# Patient Record
Sex: MALE | Race: WHITE | NOT HISPANIC OR LATINO | URBAN - METROPOLITAN AREA
[De-identification: names, ages, dates, MRNs, and addresses within clinical notes are randomized per-mention and may not be internally consistent; named-entity substitution may affect disease eponyms.]

---

## 2024-01-06 ENCOUNTER — INPATIENT (INPATIENT)
Facility: HOSPITAL | Age: 79
LOS: 1 days | Discharge: HOME | DRG: 871 | End: 2024-01-08
Attending: STUDENT IN AN ORGANIZED HEALTH CARE EDUCATION/TRAINING PROGRAM | Admitting: HOSPITALIST
Payer: MEDICARE

## 2024-01-06 VITALS — HEIGHT: 72 IN | WEIGHT: 205.03 LBS

## 2024-01-06 LAB
ALBUMIN SERPL ELPH-MCNC: 3.7 G/DL — SIGNIFICANT CHANGE UP (ref 3.3–5)
ALBUMIN SERPL ELPH-MCNC: 3.7 G/DL — SIGNIFICANT CHANGE UP (ref 3.3–5)
ALP SERPL-CCNC: 56 U/L — SIGNIFICANT CHANGE UP (ref 40–120)
ALP SERPL-CCNC: 56 U/L — SIGNIFICANT CHANGE UP (ref 40–120)
ALT FLD-CCNC: 46 U/L — HIGH (ref 10–45)
ALT FLD-CCNC: 46 U/L — HIGH (ref 10–45)
ANION GAP SERPL CALC-SCNC: 12 MMOL/L — SIGNIFICANT CHANGE UP (ref 5–17)
ANION GAP SERPL CALC-SCNC: 12 MMOL/L — SIGNIFICANT CHANGE UP (ref 5–17)
AST SERPL-CCNC: 53 U/L — HIGH (ref 10–40)
AST SERPL-CCNC: 53 U/L — HIGH (ref 10–40)
BASE EXCESS BLDV CALC-SCNC: 3.4 MMOL/L — HIGH (ref -2–3)
BASE EXCESS BLDV CALC-SCNC: 3.4 MMOL/L — HIGH (ref -2–3)
BASOPHILS # BLD AUTO: 0.03 K/UL — SIGNIFICANT CHANGE UP (ref 0–0.2)
BASOPHILS # BLD AUTO: 0.03 K/UL — SIGNIFICANT CHANGE UP (ref 0–0.2)
BASOPHILS NFR BLD AUTO: 0.2 % — SIGNIFICANT CHANGE UP (ref 0–2)
BASOPHILS NFR BLD AUTO: 0.2 % — SIGNIFICANT CHANGE UP (ref 0–2)
BILIRUB SERPL-MCNC: 0.4 MG/DL — SIGNIFICANT CHANGE UP (ref 0.2–1.2)
BILIRUB SERPL-MCNC: 0.4 MG/DL — SIGNIFICANT CHANGE UP (ref 0.2–1.2)
BUN SERPL-MCNC: 29 MG/DL — HIGH (ref 7–23)
BUN SERPL-MCNC: 29 MG/DL — HIGH (ref 7–23)
CA-I SERPL-SCNC: 1.23 MMOL/L — SIGNIFICANT CHANGE UP (ref 1.15–1.33)
CA-I SERPL-SCNC: 1.23 MMOL/L — SIGNIFICANT CHANGE UP (ref 1.15–1.33)
CALCIUM SERPL-MCNC: 9.3 MG/DL — SIGNIFICANT CHANGE UP (ref 8.4–10.5)
CALCIUM SERPL-MCNC: 9.3 MG/DL — SIGNIFICANT CHANGE UP (ref 8.4–10.5)
CHLORIDE BLDV-SCNC: 103 MMOL/L — SIGNIFICANT CHANGE UP (ref 96–108)
CHLORIDE BLDV-SCNC: 103 MMOL/L — SIGNIFICANT CHANGE UP (ref 96–108)
CHLORIDE SERPL-SCNC: 104 MMOL/L — SIGNIFICANT CHANGE UP (ref 96–108)
CHLORIDE SERPL-SCNC: 104 MMOL/L — SIGNIFICANT CHANGE UP (ref 96–108)
CO2 BLDV-SCNC: 30 MMOL/L — HIGH (ref 22–26)
CO2 BLDV-SCNC: 30 MMOL/L — HIGH (ref 22–26)
CO2 SERPL-SCNC: 25 MMOL/L — SIGNIFICANT CHANGE UP (ref 22–31)
CO2 SERPL-SCNC: 25 MMOL/L — SIGNIFICANT CHANGE UP (ref 22–31)
CREAT SERPL-MCNC: 0.86 MG/DL — SIGNIFICANT CHANGE UP (ref 0.5–1.3)
CREAT SERPL-MCNC: 0.86 MG/DL — SIGNIFICANT CHANGE UP (ref 0.5–1.3)
EGFR: 89 ML/MIN/1.73M2 — SIGNIFICANT CHANGE UP
EGFR: 89 ML/MIN/1.73M2 — SIGNIFICANT CHANGE UP
EOSINOPHIL # BLD AUTO: 0 K/UL — SIGNIFICANT CHANGE UP (ref 0–0.5)
EOSINOPHIL # BLD AUTO: 0 K/UL — SIGNIFICANT CHANGE UP (ref 0–0.5)
EOSINOPHIL NFR BLD AUTO: 0 % — SIGNIFICANT CHANGE UP (ref 0–6)
EOSINOPHIL NFR BLD AUTO: 0 % — SIGNIFICANT CHANGE UP (ref 0–6)
FLUAV AG NPH QL: DETECTED
FLUAV AG NPH QL: DETECTED
FLUBV AG NPH QL: SIGNIFICANT CHANGE UP
FLUBV AG NPH QL: SIGNIFICANT CHANGE UP
GAS PNL BLDV: 136 MMOL/L — SIGNIFICANT CHANGE UP (ref 136–145)
GAS PNL BLDV: 136 MMOL/L — SIGNIFICANT CHANGE UP (ref 136–145)
GAS PNL BLDV: SIGNIFICANT CHANGE UP
GLUCOSE BLDV-MCNC: 130 MG/DL — HIGH (ref 70–99)
GLUCOSE BLDV-MCNC: 130 MG/DL — HIGH (ref 70–99)
GLUCOSE SERPL-MCNC: 124 MG/DL — HIGH (ref 70–99)
GLUCOSE SERPL-MCNC: 124 MG/DL — HIGH (ref 70–99)
HCO3 BLDV-SCNC: 28 MMOL/L — SIGNIFICANT CHANGE UP (ref 22–29)
HCO3 BLDV-SCNC: 28 MMOL/L — SIGNIFICANT CHANGE UP (ref 22–29)
HCT VFR BLD CALC: 43.7 % — SIGNIFICANT CHANGE UP (ref 39–50)
HCT VFR BLD CALC: 43.7 % — SIGNIFICANT CHANGE UP (ref 39–50)
HCT VFR BLDA CALC: 47 % — SIGNIFICANT CHANGE UP (ref 39–51)
HCT VFR BLDA CALC: 47 % — SIGNIFICANT CHANGE UP (ref 39–51)
HGB BLD CALC-MCNC: 15.6 G/DL — SIGNIFICANT CHANGE UP (ref 12.6–17.4)
HGB BLD CALC-MCNC: 15.6 G/DL — SIGNIFICANT CHANGE UP (ref 12.6–17.4)
HGB BLD-MCNC: 14.6 G/DL — SIGNIFICANT CHANGE UP (ref 13–17)
HGB BLD-MCNC: 14.6 G/DL — SIGNIFICANT CHANGE UP (ref 13–17)
IMM GRANULOCYTES NFR BLD AUTO: 0.8 % — SIGNIFICANT CHANGE UP (ref 0–0.9)
IMM GRANULOCYTES NFR BLD AUTO: 0.8 % — SIGNIFICANT CHANGE UP (ref 0–0.9)
LACTATE BLDV-MCNC: 1.8 MMOL/L — SIGNIFICANT CHANGE UP (ref 0.5–2)
LACTATE BLDV-MCNC: 1.8 MMOL/L — SIGNIFICANT CHANGE UP (ref 0.5–2)
LYMPHOCYTES # BLD AUTO: 0.84 K/UL — LOW (ref 1–3.3)
LYMPHOCYTES # BLD AUTO: 0.84 K/UL — LOW (ref 1–3.3)
LYMPHOCYTES # BLD AUTO: 5.4 % — LOW (ref 13–44)
LYMPHOCYTES # BLD AUTO: 5.4 % — LOW (ref 13–44)
MAGNESIUM SERPL-MCNC: 2.2 MG/DL — SIGNIFICANT CHANGE UP (ref 1.6–2.6)
MAGNESIUM SERPL-MCNC: 2.2 MG/DL — SIGNIFICANT CHANGE UP (ref 1.6–2.6)
MCHC RBC-ENTMCNC: 28.1 PG — SIGNIFICANT CHANGE UP (ref 27–34)
MCHC RBC-ENTMCNC: 28.1 PG — SIGNIFICANT CHANGE UP (ref 27–34)
MCHC RBC-ENTMCNC: 33.4 GM/DL — SIGNIFICANT CHANGE UP (ref 32–36)
MCHC RBC-ENTMCNC: 33.4 GM/DL — SIGNIFICANT CHANGE UP (ref 32–36)
MCV RBC AUTO: 84.2 FL — SIGNIFICANT CHANGE UP (ref 80–100)
MCV RBC AUTO: 84.2 FL — SIGNIFICANT CHANGE UP (ref 80–100)
MONOCYTES # BLD AUTO: 1.28 K/UL — HIGH (ref 0–0.9)
MONOCYTES # BLD AUTO: 1.28 K/UL — HIGH (ref 0–0.9)
MONOCYTES NFR BLD AUTO: 8.2 % — SIGNIFICANT CHANGE UP (ref 2–14)
MONOCYTES NFR BLD AUTO: 8.2 % — SIGNIFICANT CHANGE UP (ref 2–14)
NEUTROPHILS # BLD AUTO: 13.35 K/UL — HIGH (ref 1.8–7.4)
NEUTROPHILS # BLD AUTO: 13.35 K/UL — HIGH (ref 1.8–7.4)
NEUTROPHILS NFR BLD AUTO: 85.4 % — HIGH (ref 43–77)
NEUTROPHILS NFR BLD AUTO: 85.4 % — HIGH (ref 43–77)
NRBC # BLD: 0 /100 WBCS — SIGNIFICANT CHANGE UP (ref 0–0)
NRBC # BLD: 0 /100 WBCS — SIGNIFICANT CHANGE UP (ref 0–0)
PCO2 BLDV: 44 MMHG — SIGNIFICANT CHANGE UP (ref 42–55)
PCO2 BLDV: 44 MMHG — SIGNIFICANT CHANGE UP (ref 42–55)
PH BLDV: 7.42 — SIGNIFICANT CHANGE UP (ref 7.32–7.43)
PH BLDV: 7.42 — SIGNIFICANT CHANGE UP (ref 7.32–7.43)
PHOSPHATE SERPL-MCNC: 1.9 MG/DL — LOW (ref 2.5–4.5)
PHOSPHATE SERPL-MCNC: 1.9 MG/DL — LOW (ref 2.5–4.5)
PLATELET # BLD AUTO: 277 K/UL — SIGNIFICANT CHANGE UP (ref 150–400)
PLATELET # BLD AUTO: 277 K/UL — SIGNIFICANT CHANGE UP (ref 150–400)
PO2 BLDV: 34 MMHG — SIGNIFICANT CHANGE UP (ref 25–45)
PO2 BLDV: 34 MMHG — SIGNIFICANT CHANGE UP (ref 25–45)
POTASSIUM BLDV-SCNC: 3.6 MMOL/L — SIGNIFICANT CHANGE UP (ref 3.5–5.1)
POTASSIUM BLDV-SCNC: 3.6 MMOL/L — SIGNIFICANT CHANGE UP (ref 3.5–5.1)
POTASSIUM SERPL-MCNC: 3.7 MMOL/L — SIGNIFICANT CHANGE UP (ref 3.5–5.3)
POTASSIUM SERPL-MCNC: 3.7 MMOL/L — SIGNIFICANT CHANGE UP (ref 3.5–5.3)
POTASSIUM SERPL-SCNC: 3.7 MMOL/L — SIGNIFICANT CHANGE UP (ref 3.5–5.3)
POTASSIUM SERPL-SCNC: 3.7 MMOL/L — SIGNIFICANT CHANGE UP (ref 3.5–5.3)
PROT SERPL-MCNC: 6.9 G/DL — SIGNIFICANT CHANGE UP (ref 6–8.3)
PROT SERPL-MCNC: 6.9 G/DL — SIGNIFICANT CHANGE UP (ref 6–8.3)
RBC # BLD: 5.19 M/UL — SIGNIFICANT CHANGE UP (ref 4.2–5.8)
RBC # BLD: 5.19 M/UL — SIGNIFICANT CHANGE UP (ref 4.2–5.8)
RBC # FLD: 13.9 % — SIGNIFICANT CHANGE UP (ref 10.3–14.5)
RBC # FLD: 13.9 % — SIGNIFICANT CHANGE UP (ref 10.3–14.5)
RSV RNA NPH QL NAA+NON-PROBE: SIGNIFICANT CHANGE UP
RSV RNA NPH QL NAA+NON-PROBE: SIGNIFICANT CHANGE UP
SAO2 % BLDV: 58.3 % — LOW (ref 67–88)
SAO2 % BLDV: 58.3 % — LOW (ref 67–88)
SARS-COV-2 RNA SPEC QL NAA+PROBE: SIGNIFICANT CHANGE UP
SARS-COV-2 RNA SPEC QL NAA+PROBE: SIGNIFICANT CHANGE UP
SODIUM SERPL-SCNC: 141 MMOL/L — SIGNIFICANT CHANGE UP (ref 135–145)
SODIUM SERPL-SCNC: 141 MMOL/L — SIGNIFICANT CHANGE UP (ref 135–145)
WBC # BLD: 15.62 K/UL — HIGH (ref 3.8–10.5)
WBC # BLD: 15.62 K/UL — HIGH (ref 3.8–10.5)
WBC # FLD AUTO: 15.62 K/UL — HIGH (ref 3.8–10.5)
WBC # FLD AUTO: 15.62 K/UL — HIGH (ref 3.8–10.5)

## 2024-01-06 PROCEDURE — 99285 EMERGENCY DEPT VISIT HI MDM: CPT

## 2024-01-06 PROCEDURE — 71046 X-RAY EXAM CHEST 2 VIEWS: CPT | Mod: 26

## 2024-01-06 RX ORDER — IPRATROPIUM/ALBUTEROL SULFATE 18-103MCG
3 AEROSOL WITH ADAPTER (GRAM) INHALATION ONCE
Refills: 0 | Status: COMPLETED | OUTPATIENT
Start: 2024-01-06 | End: 2024-01-06

## 2024-01-06 RX ORDER — SODIUM CHLORIDE 9 MG/ML
1000 INJECTION INTRAMUSCULAR; INTRAVENOUS; SUBCUTANEOUS ONCE
Refills: 0 | Status: COMPLETED | OUTPATIENT
Start: 2024-01-06 | End: 2024-01-06

## 2024-01-06 RX ADMIN — SODIUM CHLORIDE 1000 MILLILITER(S): 9 INJECTION INTRAMUSCULAR; INTRAVENOUS; SUBCUTANEOUS at 22:26

## 2024-01-06 RX ADMIN — Medication 3 MILLILITER(S): at 22:28

## 2024-01-06 NOTE — ED ADULT NURSE NOTE - CHIEF COMPLAINT QUOTE
flu-like sx x 1 wk, flu/covid: neg, seen at [facility], [place] chest: neg, started on levaquin/steroids/azithryomycin by PMD, c/o exhaustion, decreased PO intake flu-like sx x 1 wk, flu/covid: neg, seen at [facility], [place] chest: neg, started on levaquin/steroids/azithryomycin by PMD, c/o exhaustion, decreased PO intake

## 2024-01-06 NOTE — ED ADULT TRIAGE NOTE - CHIEF COMPLAINT QUOTE
flu-like sx x 1 wk, flu/covid: neg, seen at [facility], [place] chest: neg, started on levaquin/steroids/azithryomycin by PMD, c/o exhaustion, decreased PO intake flu-like sx x 1 wk, flu/covid: neg, seen at [facility], [place] chest: neg, started on levaquin/steroids/azithryomycin by PMD, c/o exhaustion, decreased PO intake

## 2024-01-06 NOTE — ED ADULT NURSE NOTE - OBJECTIVE STATEMENT
78 y.o. male coming in from home via private car for SOB x 1 week. pt states that he has been having flu like symptoms x 1 week, had been seen by his PCP, chest CT and flu/covid swab negative, pt was started on abx and steroids and states that the SOB has not been getting any better. pt also endorsing decreased PO intake and increased lethargy. pt denies PMH. A&Ox3, vss, pt noted to be tachypneic, states this has been baseline since he started feeling symptoms, denies CP/dizziness/urinary symptoms/N/V/D.

## 2024-01-06 NOTE — ED PROVIDER NOTE - PROGRESS NOTE DETAILS
Elvin TREADWELL, PGY-1;    Patient with new O2 requirement, desaturation to 92 on RA, daughter believes its safer for patient to saty in hospital, will admit for influenza and new O2 requirement. Elvin TREADWELL, PGY-1;    Patient ambulatred, desaturation off O2 to 91, patient tachycardic to 104. Will admit patient.

## 2024-01-06 NOTE — ED PROVIDER NOTE - ATTENDING CONTRIBUTION TO CARE
MD Cee:  patient seen and evaluated with the resident.  I was present for key portions of the History & Physical, and I agree with the Impression & Plan.    Patient is a       He will ECG directly visualized by me and shows normal sinus rhythm, rate 80, , , QTc 447.  No ST elevations or depressions. MD Cee:  patient seen and evaluated with the resident.  I was present for key portions of the History & Physical, and I agree with the Impression & Plan.  MD Cee:  patient seen and evaluated with the resident.  I was present for key portions of the History & Physical, and I agree with the Impression & Plan.    Patient is a 78-year-old male brought in by daughter for evaluation of fatigue.  Patient has had a cough for the last 3 days, clinically diagnosed with pneumonia by PMD prescribed Levaquin and azithromycin, without improvement.    Associated symptoms: No chest pain or shortness of breath, + global fatigue    Vital signs: Blood pressure 140/83, heart rate 73, respirations 22, temperature 36.7, O2 sat 98% on 2 L,  Geriatric male, fatigued appearing head: NC/AT  Neck: trachea midline  Resp: Bilateral wheezing throughout  CV: RRR, no RMG  Abd: nondistended  Ext: no deformities  Neuro:  A&Ox4 appears non focal  Skin:  Warm and dry as visualized  Psych:  Normal affect and mood    Medical decision making:  Differential diagnosis at this time includes viral pneumonia given lack of response to outpatient antibiotics and lack of infiltrate on chest x-ray today.  Patient will be swabbed for flu COVID RSV.  Unclear if patient was or is hypoxic; we will turn off supplemental O2 and assess for development of hypoxia.  If patient is hypoxic on room air he will need to be admitted to the hospital.  If flu positive he is a good candidate for Tamiflu.    He will ECG directly visualized by me and shows normal sinus rhythm, rate 80, , , QTc 447.  No ST elevations or depressions.

## 2024-01-06 NOTE — ED PROVIDER NOTE - PHYSICAL EXAMINATION
Physical Exam:  Gen: NAD, AOx3, non-toxic appearing, able to ambulate without assistance  Head: NCAT  HEENT: EOMI, PEERLA, normal conjunctiva, tongue midline, oral mucosa moist  Lung: b/l wheezing, no s  CV: RRR, no murmurs, rubs or gallops  Abd: soft, NT, ND, no guarding, no rigidity, no rebound tenderness, no CVA tenderness   MSK: no visible deformities, ROM normal in UE/LE, no back pain  Neuro: No focal sensory or motor deficits  Skin: Warm, well perfused, no rash, no leg swelling  Psych: normal affect, calm

## 2024-01-06 NOTE — ED ADULT NURSE NOTE - HISTORY OF COVID-19 VACCINATION
Psychology Progress Note    Date: May 2, 2022    Time length and type of treatment: 48 minutes (9:00 AM - 9:48 AM), individual therapy    After review of the patient's situation, this visit was changed from an in-person visit to a  video visit via [employer] to reduce the risk of COVID 19 exposure. Patient was informed that policies and procedures that govern in-person sessions would also apply to  video sessions. Patient was also informed that  video sessions would be discontinued when COVID 19 exposure is no longer a concern (as determined by [facility]).     Patient location: Patient home in [place], [place]  Provider location:  [facility] - Provider remote location    Patient was in agreement with proceeding with a  video session.      Necessity: This session is necessary to address the patient's depression, anxiety, hoarding, and OCD.  Today we focus on the patient's treatment plan, specifically exploring thoughts and expectations of self and others, and coping strategies. The reader is invited to review the patient's full treatment plan in the Media section of the patient's Epic medical record.    Psychotherapeutic Technique: This writer utilized motivational interviewing, active listening, reassurance and support in the context of cognitive behavioral therapy to address the above.      MENTAL STATUS EVALUATION  Grooming: Unable to determine due to telephone visit  Attire: Unable to determine due to telephone visit  Age: Unable to determine due to telephone visit  Behavior Towards Examiner: Cooperative  Motor Activity: Unable to determine due to telephone visit  Eye Contact: Unable to determine due to telephone visit  Mood: Anxious   Affect: full range  Speech/Language: pressured  Attention: Normal  Concentration: Sufficient  Thought Process: tangential  Thought Content: Clear    Orientation: Appeared oriented to person, place, and time, though not formally  established  Memory: No evidence of impairment.  Judgement: Adequate  Estimated Intelligence: Average  Demonstrated Insight: Adequate  Fund of Knowledge: Adequate    Intervention:   Patient reported he is generally doing well, explaining that he has been responding to his fatigue with behavioral activation, and was surprised that he does feel better when he exercises instead of sitting on his couch when he's fatigued.  He noted that mood has improved gradually over the last six weeks, which he attributed to increased sunlight. Patient re-requested help with staying accountable for his back taxes, and recommitted to more directly addressing his hoarding once his taxes are caught up. We also discussed how fear of change can keep patient from addressing his OCD and continued to work on making OCD ego dystonic.    Progress:  Patient reports improved depression and expressed pride that he has reduced hand washing from 30 minutes to 10 minutes/time.    Plan:   We will meet again in 2 weeks to address the patient's depression, hoarding, anxiety, and OCD.  Estimated duration of treatment is 10+ individual therapy sessions (18211) at twice monthly intervals. Treatment is expected to be completed by March 2023.     Diagnosis:  Obsessive-Compulsive Disorder (OCD)  Major Depressive Disorder, recurrent, severe  Hoarding Disorder  Generalized Anxiety Disorder     Vaccine status unknown

## 2024-01-06 NOTE — ED PROVIDER NOTE - OBJECTIVE STATEMENT
77y/o M PmHx Hypothyroidism, HTN, HLD, GERD presents for 3 days of cough, fatigue, nasal congestion, sinus congestion. Patient was evaluated at [facility] - CTA negative, CXR negative, was d/c. Patient f/u with PCP prescribed levofloxacin, azithromycin and prednisone for symptoms. Patient returns to [facility] ED for fatigue, no appetite. Patient endorses nonproductive cough, fatigue, weakness. Denies chest pain, sob, fever, dizziness, lightheadness, nausea, vomiting, diarrhea, abdominal pain. 79y/o M PmHx Hypothyroidism, HTN, HLD, GERD presents for 3 days of cough, fatigue, nasal congestion, sinus congestion. Patient was evaluated at [facility] - CTA negative, CXR negative, was d/c. Patient f/u with PCP prescribed levofloxacin, azithromycin and prednisone for symptoms. Patient returns to [facility] ED for fatigue, no appetite. Patient endorses nonproductive cough, fatigue, weakness. Denies chest pain, sob, fever, dizziness, lightheadness, nausea, vomiting, diarrhea, abdominal pain.

## 2024-01-06 NOTE — ED ADULT NURSE NOTE - NSFALLHARMRISKINTERV_ED_ALL_ED
Assistance OOB with selected safe patient handling equipment if applicable/Assistance with ambulation/Communicate risk of Fall with Harm to all staff, patient, and family/Monitor gait and stability/Provide patient with walking aids/Provide visual cue: red socks, yellow wristband, yellow gown, etc/Reinforce activity limits and safety measures with patient and family/Bed in lowest position, wheels locked, appropriate side rails in place/Call bell, personal items and telephone in reach/Instruct patient to call for assistance before getting out of bed/chair/stretcher/Non-slip footwear applied when patient is off stretcher/[place] to call system/Physically safe environment - no spills, clutter or unnecessary equipment/Purposeful Proactive Rounding/Room/bathroom lighting operational, light cord in reach Assistance OOB with selected safe patient handling equipment if applicable/Assistance with ambulation/Communicate risk of Fall with Harm to all staff, patient, and family/Monitor gait and stability/Provide patient with walking aids/Provide visual cue: red socks, yellow wristband, yellow gown, etc/Reinforce activity limits and safety measures with patient and family/Bed in lowest position, wheels locked, appropriate side rails in place/Call bell, personal items and telephone in reach/Instruct patient to call for assistance before getting out of bed/chair/stretcher/Non-slip footwear applied when patient is off stretcher/[place] to call system/Physically safe environment - no spills, clutter or unnecessary equipment/Purposeful Proactive Rounding/Room/bathroom lighting operational, light cord in reach

## 2024-01-07 DIAGNOSIS — N32.81 OVERACTIVE BLADDER: ICD-10-CM

## 2024-01-07 DIAGNOSIS — J10.1 INFLUENZA DUE TO OTHER IDENTIFIED INFLUENZA VIRUS WITH OTHER RESPIRATORY MANIFESTATIONS: ICD-10-CM

## 2024-01-07 DIAGNOSIS — F41.9 ANXIETY DISORDER, UNSPECIFIED: ICD-10-CM

## 2024-01-07 DIAGNOSIS — A41.9 SEPSIS, UNSPECIFIED ORGANISM: ICD-10-CM

## 2024-01-07 DIAGNOSIS — E78.5 HYPERLIPIDEMIA, UNSPECIFIED: ICD-10-CM

## 2024-01-07 DIAGNOSIS — Z29.9 ENCOUNTER FOR PROPHYLACTIC MEASURES, UNSPECIFIED: ICD-10-CM

## 2024-01-07 DIAGNOSIS — E03.9 HYPOTHYROIDISM, UNSPECIFIED: ICD-10-CM

## 2024-01-07 DIAGNOSIS — I10 ESSENTIAL (PRIMARY) HYPERTENSION: ICD-10-CM

## 2024-01-07 DIAGNOSIS — K21.9 GASTRO-ESOPHAGEAL REFLUX DISEASE WITHOUT ESOPHAGITIS: ICD-10-CM

## 2024-01-07 LAB
ANION GAP SERPL CALC-SCNC: 13 MMOL/L — SIGNIFICANT CHANGE UP (ref 5–17)
ANION GAP SERPL CALC-SCNC: 13 MMOL/L — SIGNIFICANT CHANGE UP (ref 5–17)
BASOPHILS # BLD AUTO: 0.04 K/UL — SIGNIFICANT CHANGE UP (ref 0–0.2)
BASOPHILS # BLD AUTO: 0.04 K/UL — SIGNIFICANT CHANGE UP (ref 0–0.2)
BASOPHILS NFR BLD AUTO: 0.3 % — SIGNIFICANT CHANGE UP (ref 0–2)
BASOPHILS NFR BLD AUTO: 0.3 % — SIGNIFICANT CHANGE UP (ref 0–2)
BUN SERPL-MCNC: 24 MG/DL — HIGH (ref 7–23)
BUN SERPL-MCNC: 24 MG/DL — HIGH (ref 7–23)
CALCIUM SERPL-MCNC: 8.8 MG/DL — SIGNIFICANT CHANGE UP (ref 8.4–10.5)
CALCIUM SERPL-MCNC: 8.8 MG/DL — SIGNIFICANT CHANGE UP (ref 8.4–10.5)
CHLORIDE SERPL-SCNC: 105 MMOL/L — SIGNIFICANT CHANGE UP (ref 96–108)
CHLORIDE SERPL-SCNC: 105 MMOL/L — SIGNIFICANT CHANGE UP (ref 96–108)
CO2 SERPL-SCNC: 24 MMOL/L — SIGNIFICANT CHANGE UP (ref 22–31)
CO2 SERPL-SCNC: 24 MMOL/L — SIGNIFICANT CHANGE UP (ref 22–31)
CREAT SERPL-MCNC: 0.76 MG/DL — SIGNIFICANT CHANGE UP (ref 0.5–1.3)
CREAT SERPL-MCNC: 0.76 MG/DL — SIGNIFICANT CHANGE UP (ref 0.5–1.3)
EGFR: 92 ML/MIN/1.73M2 — SIGNIFICANT CHANGE UP
EGFR: 92 ML/MIN/1.73M2 — SIGNIFICANT CHANGE UP
EOSINOPHIL # BLD AUTO: 0.02 K/UL — SIGNIFICANT CHANGE UP (ref 0–0.5)
EOSINOPHIL # BLD AUTO: 0.02 K/UL — SIGNIFICANT CHANGE UP (ref 0–0.5)
EOSINOPHIL NFR BLD AUTO: 0.2 % — SIGNIFICANT CHANGE UP (ref 0–6)
EOSINOPHIL NFR BLD AUTO: 0.2 % — SIGNIFICANT CHANGE UP (ref 0–6)
GLUCOSE SERPL-MCNC: 143 MG/DL — HIGH (ref 70–99)
GLUCOSE SERPL-MCNC: 143 MG/DL — HIGH (ref 70–99)
HCT VFR BLD CALC: 41 % — SIGNIFICANT CHANGE UP (ref 39–50)
HCT VFR BLD CALC: 41 % — SIGNIFICANT CHANGE UP (ref 39–50)
HGB BLD-MCNC: 13.3 G/DL — SIGNIFICANT CHANGE UP (ref 13–17)
HGB BLD-MCNC: 13.3 G/DL — SIGNIFICANT CHANGE UP (ref 13–17)
IMM GRANULOCYTES NFR BLD AUTO: 0.9 % — SIGNIFICANT CHANGE UP (ref 0–0.9)
IMM GRANULOCYTES NFR BLD AUTO: 0.9 % — SIGNIFICANT CHANGE UP (ref 0–0.9)
LYMPHOCYTES # BLD AUTO: 1.13 K/UL — SIGNIFICANT CHANGE UP (ref 1–3.3)
LYMPHOCYTES # BLD AUTO: 1.13 K/UL — SIGNIFICANT CHANGE UP (ref 1–3.3)
LYMPHOCYTES # BLD AUTO: 9.2 % — LOW (ref 13–44)
LYMPHOCYTES # BLD AUTO: 9.2 % — LOW (ref 13–44)
MAGNESIUM SERPL-MCNC: 2 MG/DL — SIGNIFICANT CHANGE UP (ref 1.6–2.6)
MAGNESIUM SERPL-MCNC: 2 MG/DL — SIGNIFICANT CHANGE UP (ref 1.6–2.6)
MCHC RBC-ENTMCNC: 27.9 PG — SIGNIFICANT CHANGE UP (ref 27–34)
MCHC RBC-ENTMCNC: 27.9 PG — SIGNIFICANT CHANGE UP (ref 27–34)
MCHC RBC-ENTMCNC: 32.4 GM/DL — SIGNIFICANT CHANGE UP (ref 32–36)
MCHC RBC-ENTMCNC: 32.4 GM/DL — SIGNIFICANT CHANGE UP (ref 32–36)
MCV RBC AUTO: 86.1 FL — SIGNIFICANT CHANGE UP (ref 80–100)
MCV RBC AUTO: 86.1 FL — SIGNIFICANT CHANGE UP (ref 80–100)
MONOCYTES # BLD AUTO: 1.1 K/UL — HIGH (ref 0–0.9)
MONOCYTES # BLD AUTO: 1.1 K/UL — HIGH (ref 0–0.9)
MONOCYTES NFR BLD AUTO: 8.9 % — SIGNIFICANT CHANGE UP (ref 2–14)
MONOCYTES NFR BLD AUTO: 8.9 % — SIGNIFICANT CHANGE UP (ref 2–14)
NEUTROPHILS # BLD AUTO: 9.91 K/UL — HIGH (ref 1.8–7.4)
NEUTROPHILS # BLD AUTO: 9.91 K/UL — HIGH (ref 1.8–7.4)
NEUTROPHILS NFR BLD AUTO: 80.5 % — HIGH (ref 43–77)
NEUTROPHILS NFR BLD AUTO: 80.5 % — HIGH (ref 43–77)
NRBC # BLD: 0 /100 WBCS — SIGNIFICANT CHANGE UP (ref 0–0)
NRBC # BLD: 0 /100 WBCS — SIGNIFICANT CHANGE UP (ref 0–0)
PHOSPHATE SERPL-MCNC: 1.8 MG/DL — LOW (ref 2.5–4.5)
PHOSPHATE SERPL-MCNC: 1.8 MG/DL — LOW (ref 2.5–4.5)
PLATELET # BLD AUTO: 267 K/UL — SIGNIFICANT CHANGE UP (ref 150–400)
PLATELET # BLD AUTO: 267 K/UL — SIGNIFICANT CHANGE UP (ref 150–400)
POTASSIUM SERPL-MCNC: 3.4 MMOL/L — LOW (ref 3.5–5.3)
POTASSIUM SERPL-MCNC: 3.4 MMOL/L — LOW (ref 3.5–5.3)
POTASSIUM SERPL-SCNC: 3.4 MMOL/L — LOW (ref 3.5–5.3)
POTASSIUM SERPL-SCNC: 3.4 MMOL/L — LOW (ref 3.5–5.3)
RBC # BLD: 4.76 M/UL — SIGNIFICANT CHANGE UP (ref 4.2–5.8)
RBC # BLD: 4.76 M/UL — SIGNIFICANT CHANGE UP (ref 4.2–5.8)
RBC # FLD: 14.1 % — SIGNIFICANT CHANGE UP (ref 10.3–14.5)
RBC # FLD: 14.1 % — SIGNIFICANT CHANGE UP (ref 10.3–14.5)
SODIUM SERPL-SCNC: 142 MMOL/L — SIGNIFICANT CHANGE UP (ref 135–145)
SODIUM SERPL-SCNC: 142 MMOL/L — SIGNIFICANT CHANGE UP (ref 135–145)
WBC # BLD: 12.31 K/UL — HIGH (ref 3.8–10.5)
WBC # BLD: 12.31 K/UL — HIGH (ref 3.8–10.5)
WBC # FLD AUTO: 12.31 K/UL — HIGH (ref 3.8–10.5)
WBC # FLD AUTO: 12.31 K/UL — HIGH (ref 3.8–10.5)

## 2024-01-07 PROCEDURE — 99223 1ST HOSP IP/OBS HIGH 75: CPT

## 2024-01-07 RX ORDER — GUAIFENESIN/DEXTROMETHORPHAN 600MG-30MG
10 TABLET, EXTENDED RELEASE 12 HR ORAL EVERY 4 HOURS
Refills: 0 | Status: DISCONTINUED | OUTPATIENT
Start: 2024-01-07 | End: 2024-01-08

## 2024-01-07 RX ORDER — SODIUM,POTASSIUM PHOSPHATES 278-250MG
1 POWDER IN PACKET (EA) ORAL ONCE
Refills: 0 | Status: COMPLETED | OUTPATIENT
Start: 2024-01-07 | End: 2024-01-08

## 2024-01-07 RX ORDER — OXYBUTYNIN CHLORIDE 5 MG
1 TABLET ORAL
Refills: 0 | DISCHARGE

## 2024-01-07 RX ORDER — METOPROLOL TARTRATE 50 MG
1 TABLET ORAL
Refills: 0 | DISCHARGE

## 2024-01-07 RX ORDER — IPRATROPIUM/ALBUTEROL SULFATE 18-103MCG
3 AEROSOL WITH ADAPTER (GRAM) INHALATION ONCE
Refills: 0 | Status: COMPLETED | OUTPATIENT
Start: 2024-01-07 | End: 2024-01-07

## 2024-01-07 RX ORDER — ASPIRIN/CALCIUM CARB/MAGNESIUM 324 MG
81 TABLET ORAL DAILY
Refills: 0 | Status: DISCONTINUED | OUTPATIENT
Start: 2024-01-07 | End: 2024-01-08

## 2024-01-07 RX ORDER — LANOLIN ALCOHOL/MO/W.PET/CERES
3 CREAM (GRAM) TOPICAL AT BEDTIME
Refills: 0 | Status: DISCONTINUED | OUTPATIENT
Start: 2024-01-07 | End: 2024-01-08

## 2024-01-07 RX ORDER — SODIUM CHLORIDE 9 MG/ML
1000 INJECTION, SOLUTION INTRAVENOUS
Refills: 0 | Status: DISCONTINUED | OUTPATIENT
Start: 2024-01-07 | End: 2024-01-07

## 2024-01-07 RX ORDER — ACETAMINOPHEN 500 MG
650 TABLET ORAL ONCE
Refills: 0 | Status: COMPLETED | OUTPATIENT
Start: 2024-01-07 | End: 2024-01-07

## 2024-01-07 RX ORDER — SIMVASTATIN 20 MG/1
20 TABLET, FILM COATED ORAL AT BEDTIME
Refills: 0 | Status: DISCONTINUED | OUTPATIENT
Start: 2024-01-07 | End: 2024-01-08

## 2024-01-07 RX ORDER — OXYBUTYNIN CHLORIDE 5 MG
5 TABLET ORAL DAILY
Refills: 0 | Status: DISCONTINUED | OUTPATIENT
Start: 2024-01-07 | End: 2024-01-08

## 2024-01-07 RX ORDER — METOPROLOL TARTRATE 50 MG
25 TABLET ORAL DAILY
Refills: 0 | Status: DISCONTINUED | OUTPATIENT
Start: 2024-01-07 | End: 2024-01-08

## 2024-01-07 RX ORDER — ASPIRIN/CALCIUM CARB/MAGNESIUM 324 MG
1 TABLET ORAL
Refills: 0 | DISCHARGE

## 2024-01-07 RX ORDER — PANTOPRAZOLE SODIUM 20 MG/1
40 TABLET, DELAYED RELEASE ORAL
Refills: 0 | Status: DISCONTINUED | OUTPATIENT
Start: 2024-01-07 | End: 2024-01-08

## 2024-01-07 RX ORDER — ACETAMINOPHEN 500 MG
650 TABLET ORAL EVERY 6 HOURS
Refills: 0 | Status: DISCONTINUED | OUTPATIENT
Start: 2024-01-07 | End: 2024-01-08

## 2024-01-07 RX ORDER — LEVOTHYROXINE SODIUM 125 MCG
100 TABLET ORAL DAILY
Refills: 0 | Status: DISCONTINUED | OUTPATIENT
Start: 2024-01-07 | End: 2024-01-08

## 2024-01-07 RX ORDER — ENOXAPARIN SODIUM 100 MG/ML
40 INJECTION SUBCUTANEOUS EVERY 24 HOURS
Refills: 0 | Status: DISCONTINUED | OUTPATIENT
Start: 2024-01-07 | End: 2024-01-08

## 2024-01-07 RX ORDER — SIMVASTATIN 20 MG/1
1 TABLET, FILM COATED ORAL
Refills: 0 | DISCHARGE

## 2024-01-07 RX ORDER — IPRATROPIUM/ALBUTEROL SULFATE 18-103MCG
3 AEROSOL WITH ADAPTER (GRAM) INHALATION EVERY 6 HOURS
Refills: 0 | Status: DISCONTINUED | OUTPATIENT
Start: 2024-01-07 | End: 2024-01-08

## 2024-01-07 RX ORDER — PANTOPRAZOLE SODIUM 20 MG/1
1 TABLET, DELAYED RELEASE ORAL
Refills: 0 | DISCHARGE

## 2024-01-07 RX ADMIN — Medication 3 MILLILITER(S): at 01:30

## 2024-01-07 RX ADMIN — SODIUM CHLORIDE 100 MILLILITER(S): 9 INJECTION, SOLUTION INTRAVENOUS at 01:30

## 2024-01-07 RX ADMIN — Medication 81 MILLIGRAM(S): at 12:33

## 2024-01-07 RX ADMIN — Medication 100 MICROGRAM(S): at 07:08

## 2024-01-07 RX ADMIN — Medication 20 MILLIGRAM(S): at 12:28

## 2024-01-07 RX ADMIN — PANTOPRAZOLE SODIUM 40 MILLIGRAM(S): 20 TABLET, DELAYED RELEASE ORAL at 07:09

## 2024-01-07 RX ADMIN — SIMVASTATIN 20 MILLIGRAM(S): 20 TABLET, FILM COATED ORAL at 21:35

## 2024-01-07 RX ADMIN — Medication 25 MILLIGRAM(S): at 07:09

## 2024-01-07 RX ADMIN — Medication 650 MILLIGRAM(S): at 15:25

## 2024-01-07 RX ADMIN — Medication 650 MILLIGRAM(S): at 01:30

## 2024-01-07 RX ADMIN — Medication 75 MILLIGRAM(S): at 03:22

## 2024-01-07 RX ADMIN — Medication 3 MILLIGRAM(S): at 21:35

## 2024-01-07 RX ADMIN — Medication 650 MILLIGRAM(S): at 13:51

## 2024-01-07 RX ADMIN — Medication 75 MILLIGRAM(S): at 18:30

## 2024-01-07 RX ADMIN — Medication 5 MILLIGRAM(S): at 13:47

## 2024-01-07 RX ADMIN — ENOXAPARIN SODIUM 40 MILLIGRAM(S): 100 INJECTION SUBCUTANEOUS at 07:08

## 2024-01-07 RX ADMIN — Medication 100 MILLIGRAM(S): at 12:28

## 2024-01-07 NOTE — H&P ADULT - NSICDXPASTMEDICALHX_GEN_ALL_CORE_FT
PAST MEDICAL HISTORY:  Anxiety and depression     GERD (gastroesophageal reflux disease)     HLD (hyperlipidemia)     HTN (hypertension)     Hypothyroidism     OAB (overactive bladder)

## 2024-01-07 NOTE — H&P ADULT - PROBLEM SELECTOR PLAN 2
- Tachypneic to 22 w/ leukocytosis likely i/s/o influenza A infection  - Wean O2 as tolerated for goal O2 >94%  - Duonebs PRN for SOB and wheezing  - Pt w/ wheezing on exam and 2ppd smoking hx- Discussed smoking cessation and would consider pulm ref on dc for PFTs

## 2024-01-07 NOTE — H&P ADULT - NSHPREVIEWOFSYSTEMS_GEN_ALL_CORE
CONSTITUTIONAL: Fever/Chills. No weight loss  EYES: No eye pain, visual disturbances, or discharge  ENMT: No difficulty hearing, tinnitus, vertigo; No sinus or throat pain  RESPIRATORY: nonproductive cough. No SOB. No wheezing or hemoptysis  CARDIOVASCULAR: No chest pain, palpitations, dizziness, or leg swelling  GASTROINTESTINAL: No abdominal or epigastric pain. No nausea, vomiting, or hematemesis; No diarrhea or constipation. No melena or hematochezia.  GENITOURINARY: No dysuria, frequency, hematuria, or incontinence  NEUROLOGICAL: No headaches, memory loss, loss of strength, numbness, or tremors  SKIN: No itching, burning, rashes, or lesions   LYMPH NODES: No enlarged glands  ENDOCRINE: No heat or cold intolerance; No hair loss  MUSCULOSKELETAL: No joint pain or swelling; No muscle, back pain  PSYCHIATRIC: No depression, anxiety, mood swings, or difficulty sleeping  HEME/LYMPH: No easy bruising, or bleeding gums

## 2024-01-07 NOTE — H&P ADULT - PROBLEM SELECTOR PLAN 1
- Recent large gathering at  w/ travel on flight   - Not tachycardic and O2 92% on RA w/ normotension and no LE edema- low c/f PE  - Influenza A positive  - CXR w/out consolidation to suspect bacterial PNA  - i/s/o hospitalization, will start Tamiflu 75mg BID for 5 day course  - Isolation precautions   - Robitussin and Tessalon Perle for supportive care for cough  - Isolation precautions   - Wean O2 as tolerated

## 2024-01-07 NOTE — PATIENT PROFILE ADULT - FALL HARM RISK - HARM RISK INTERVENTIONS
Assistance with ambulation/Assistance OOB with selected safe patient handling equipment/Communicate Risk of Fall with Harm to all staff/Reinforce activity limits and safety measures with patient and family/Tailored Fall Risk Interventions/Visual Cue: Yellow wristband and red socks/Bed in lowest position, wheels locked, appropriate side rails in place/Call bell, personal items and telephone in reach/Instruct patient to call for assistance before getting out of bed or chair/Non-slip footwear when patient is out of bed/[place] to call system/Physically safe environment - no spills, clutter or unnecessary equipment/Purposeful Proactive Rounding/Room/bathroom lighting operational, light cord in reach Assistance with ambulation/Assistance OOB with selected safe patient handling equipment/Communicate Risk of Fall with Harm to all staff/Reinforce activity limits and safety measures with patient and family/Tailored Fall Risk Interventions/Visual Cue: Yellow wristband and red socks/Bed in lowest position, wheels locked, appropriate side rails in place/Call bell, personal items and telephone in reach/Instruct patient to call for assistance before getting out of bed or chair/Non-slip footwear when patient is out of bed/[place] to call system/Physically safe environment - no spills, clutter or unnecessary equipment/Purposeful Proactive Rounding/Room/bathroom lighting operational, light cord in reach

## 2024-01-07 NOTE — H&P ADULT - HISTORY OF PRESENT ILLNESS
Pt is a 79yo M w/ PMH of HTN, HLD, OAB, hypothyroidism, depression pw URI symptoms.    Pt w/ limited english. Daughter at bedside providing further interpretation and collateral hx.     Recent travel to [facility] for  w/ return from travel 1 week prior to presentation. 5 days ago pt and his wife started to experience URI symptoms including F/C, nonproductive cough, nasal congestion, fatigue.     Pt visited [facility] and reportedly underwent CTA and CXR which were unremarkable and he was discharged home after which he followed up w/ PMD and started empirically on Azithromycin, Levofloxacin, and Prednisone.     Now presents to [facility] for continued fatigue and worsening lack of appetite. Otherwise reports CP w/ cough but no SOB, palpitations, abd pain, N/V, constipation or diarrhea.     In the ED tachypnea and hypoxia to 92% on RA satting ~96% on 2L NC w/ labs notable for leukocytosis and swab positive for influenza A w/ CXR unremarkable for acute process. He was administered 1L IVF and started on 100cc IVF, Tylenol, and Duoneb.    Pt is a 77yo M w/ PMH of HTN, HLD, OAB, hypothyroidism, depression pw URI symptoms.    Pt w/ limited english. Daughter at bedside providing further interpretation and collateral hx.     Recent travel to [facility] for  w/ return from travel 1 week prior to presentation. 5 days ago pt and his wife started to experience URI symptoms including F/C, nonproductive cough, nasal congestion, fatigue.     Pt visited [facility] and reportedly underwent CTA and CXR which were unremarkable and he was discharged home after which he followed up w/ PMD and started empirically on Azithromycin, Levofloxacin, and Prednisone.     Now presents to [facility] for continued fatigue and worsening lack of appetite. Otherwise reports CP w/ cough but no SOB, palpitations, abd pain, N/V, constipation or diarrhea.     In the ED tachypnea and hypoxia to 92% on RA satting ~96% on 2L NC w/ labs notable for leukocytosis and swab positive for influenza A w/ CXR unremarkable for acute process. He was administered 1L IVF and started on 100cc IVF, Tylenol, and Duoneb.

## 2024-01-07 NOTE — H&P ADULT - NSHPPHYSICALEXAM_GEN_ALL_CORE
Vital Signs Last 24 Hrs  T(C): 36.7 (07 Jan 2024 03:27), Max: 36.7 (06 Jan 2024 21:23)  T(F): 98 (07 Jan 2024 03:27), Max: 98 (06 Jan 2024 21:23)  HR: 91 (07 Jan 2024 03:27) (73 - 104)  BP: 118/64 (07 Jan 2024 03:27) (118/64 - 149/89)  BP(mean): 73 (07 Jan 2024 00:11) (73 - 73)  RR: 17 (07 Jan 2024 03:27) (17 - 24)  SpO2: 95% (07 Jan 2024 03:27) (91% - 98%)    Parameters below as of 07 Jan 2024 03:27  Patient On (Oxygen Delivery Method): nasal cannula O2 Flow (L/min): 2    CONSTITUTIONAL: Well-groomed, in no apparent distress  EYES: No conjunctival or scleral injection, non-icteric;   ENMT: No external nasal lesions; MMM  NECK: Trachea midline without palpable neck mass; thyroid not enlarged and non-tender  RESPIRATORY: Mild exp wheezing. Breathing comfortably; no dullness to percussion;   CARDIOVASCULAR: +S1S2, RRR, no M/G/R; pedal pulses full and symmetric; no lower extremity edema  GASTROINTESTINAL: No palpable masses or tenderness, +BS throughout, no rebound/guarding; no hepatosplenomegaly; no hernia palpated  LYMPHATIC: No cervical LAD or tenderness  SKIN: No rashes or ulcers noted  NEUROLOGIC: CN II-XII intact; sensation intact in LEs b/l to light touch  PSYCHIATRIC: A&Ox3; mood and affect appropriate; appropriate insight and judgment

## 2024-01-07 NOTE — H&P ADULT - ASSESSMENT
79yo M w/ PMH of HTN, HLD, OAB, hypothyroidism, depression pw URI symptoms likely i/s/o Influenza A infection

## 2024-01-07 NOTE — CHART NOTE - NSCHARTNOTEFT_GEN_A_CORE
79yo M w/ PMH of HTN, HLD, OAB, hypothyroidism, depression pw URI symptoms , found to be Flu A positive   Assessed patient at bedside.   - c/w tamiflu x 5 days   - supportive care for flu   - wean O2 as tolerated, assess ambulatory SaO2   - PT eval   - orthostatic VS   rest of plan per H & P  D/w ACP Shelby Perales 77yo M w/ PMH of HTN, HLD, OAB, hypothyroidism, depression pw URI symptoms , found to be Flu A positive   Assessed patient at bedside.   - c/w tamiflu x 5 days   - supportive care for flu   - wean O2 as tolerated, assess ambulatory SaO2   - PT eval   - orthostatic VS   rest of plan per H & P  D/w ACP Shelby Perales

## 2024-01-07 NOTE — H&P ADULT - NSHPLABSRESULTS_GEN_ALL_CORE
LABS:                      14.6   15.62 )-----------( 277      ( 06 Jan 2024 21:42 )             43.7     01-06    141  |  104  |  29<H>  ----------------------------<  124<H>  3.7   |  25  |  0.86    Ca    9.3      06 Jan 2024 21:42  Phos  1.9     01-06  Mg     2.2     01-06    TPro  6.9  /  Alb  3.7  /  TBili  0.4  /  DBili  x   /  AST  53<H>  /  ALT  46<H>  /  AlkPhos  56  01-06    LIVER FUNCTIONS - ( 06 Jan 2024 21:42 )  Alb: 3.7 g/dL / Pro: 6.9 g/dL / ALK PHOS: 56 U/L / ALT: 46 U/L / AST: 53 U/L / GGT: x           Urinalysis Basic - ( 06 Jan 2024 21:42 )  Color: x / Appearance: x / SG: x / pH: x  Gluc: 124 mg/dL / Ketone: x  / Bili: x / Urobili: x   Blood: x / Protein: x / Nitrite: x   Leuk Esterase: x / RBC: x / WBC x   Sq Epi: x / Non Sq Epi: x / Bacteria: x    IMAGING:  Xray Chest 2 Views PA/Lat (01.06.24 @ 21:48)  IMPRESSION:  - Clear lungs.  ******PRELIMINARY REPORT******    [X] Imaging personally reviewed by me- CXR w/out consolidation   [X] ECG personally interpreted by me- NSR w/out CHRISTINA or STD

## 2024-01-08 VITALS
OXYGEN SATURATION: 93 % | RESPIRATION RATE: 18 BRPM | TEMPERATURE: 98 F | SYSTOLIC BLOOD PRESSURE: 152 MMHG | HEART RATE: 77 BPM | DIASTOLIC BLOOD PRESSURE: 76 MMHG

## 2024-01-08 LAB
ANION GAP SERPL CALC-SCNC: 14 MMOL/L — SIGNIFICANT CHANGE UP (ref 5–17)
ANION GAP SERPL CALC-SCNC: 14 MMOL/L — SIGNIFICANT CHANGE UP (ref 5–17)
BUN SERPL-MCNC: 17 MG/DL — SIGNIFICANT CHANGE UP (ref 7–23)
BUN SERPL-MCNC: 17 MG/DL — SIGNIFICANT CHANGE UP (ref 7–23)
CALCIUM SERPL-MCNC: 9.2 MG/DL — SIGNIFICANT CHANGE UP (ref 8.4–10.5)
CALCIUM SERPL-MCNC: 9.2 MG/DL — SIGNIFICANT CHANGE UP (ref 8.4–10.5)
CHLORIDE SERPL-SCNC: 104 MMOL/L — SIGNIFICANT CHANGE UP (ref 96–108)
CHLORIDE SERPL-SCNC: 104 MMOL/L — SIGNIFICANT CHANGE UP (ref 96–108)
CO2 SERPL-SCNC: 22 MMOL/L — SIGNIFICANT CHANGE UP (ref 22–31)
CO2 SERPL-SCNC: 22 MMOL/L — SIGNIFICANT CHANGE UP (ref 22–31)
CREAT SERPL-MCNC: 0.72 MG/DL — SIGNIFICANT CHANGE UP (ref 0.5–1.3)
CREAT SERPL-MCNC: 0.72 MG/DL — SIGNIFICANT CHANGE UP (ref 0.5–1.3)
EGFR: 94 ML/MIN/1.73M2 — SIGNIFICANT CHANGE UP
EGFR: 94 ML/MIN/1.73M2 — SIGNIFICANT CHANGE UP
GLUCOSE SERPL-MCNC: 139 MG/DL — HIGH (ref 70–99)
GLUCOSE SERPL-MCNC: 139 MG/DL — HIGH (ref 70–99)
HCT VFR BLD CALC: 43.4 % — SIGNIFICANT CHANGE UP (ref 39–50)
HCT VFR BLD CALC: 43.4 % — SIGNIFICANT CHANGE UP (ref 39–50)
HCV AB S/CO SERPL IA: 0.1 S/CO — SIGNIFICANT CHANGE UP (ref 0–0.99)
HCV AB S/CO SERPL IA: 0.1 S/CO — SIGNIFICANT CHANGE UP (ref 0–0.99)
HCV AB SERPL-IMP: SIGNIFICANT CHANGE UP
HCV AB SERPL-IMP: SIGNIFICANT CHANGE UP
HGB BLD-MCNC: 14.6 G/DL — SIGNIFICANT CHANGE UP (ref 13–17)
HGB BLD-MCNC: 14.6 G/DL — SIGNIFICANT CHANGE UP (ref 13–17)
MAGNESIUM SERPL-MCNC: 2 MG/DL — SIGNIFICANT CHANGE UP (ref 1.6–2.6)
MAGNESIUM SERPL-MCNC: 2 MG/DL — SIGNIFICANT CHANGE UP (ref 1.6–2.6)
MCHC RBC-ENTMCNC: 28.3 PG — SIGNIFICANT CHANGE UP (ref 27–34)
MCHC RBC-ENTMCNC: 28.3 PG — SIGNIFICANT CHANGE UP (ref 27–34)
MCHC RBC-ENTMCNC: 33.6 GM/DL — SIGNIFICANT CHANGE UP (ref 32–36)
MCHC RBC-ENTMCNC: 33.6 GM/DL — SIGNIFICANT CHANGE UP (ref 32–36)
MCV RBC AUTO: 84.1 FL — SIGNIFICANT CHANGE UP (ref 80–100)
MCV RBC AUTO: 84.1 FL — SIGNIFICANT CHANGE UP (ref 80–100)
NRBC # BLD: 0 /100 WBCS — SIGNIFICANT CHANGE UP (ref 0–0)
NRBC # BLD: 0 /100 WBCS — SIGNIFICANT CHANGE UP (ref 0–0)
PHOSPHATE SERPL-MCNC: 2.7 MG/DL — SIGNIFICANT CHANGE UP (ref 2.5–4.5)
PHOSPHATE SERPL-MCNC: 2.7 MG/DL — SIGNIFICANT CHANGE UP (ref 2.5–4.5)
PLATELET # BLD AUTO: 285 K/UL — SIGNIFICANT CHANGE UP (ref 150–400)
PLATELET # BLD AUTO: 285 K/UL — SIGNIFICANT CHANGE UP (ref 150–400)
POTASSIUM SERPL-MCNC: 4 MMOL/L — SIGNIFICANT CHANGE UP (ref 3.5–5.3)
POTASSIUM SERPL-MCNC: 4 MMOL/L — SIGNIFICANT CHANGE UP (ref 3.5–5.3)
POTASSIUM SERPL-SCNC: 4 MMOL/L — SIGNIFICANT CHANGE UP (ref 3.5–5.3)
POTASSIUM SERPL-SCNC: 4 MMOL/L — SIGNIFICANT CHANGE UP (ref 3.5–5.3)
RBC # BLD: 5.16 M/UL — SIGNIFICANT CHANGE UP (ref 4.2–5.8)
RBC # BLD: 5.16 M/UL — SIGNIFICANT CHANGE UP (ref 4.2–5.8)
RBC # FLD: 14.2 % — SIGNIFICANT CHANGE UP (ref 10.3–14.5)
RBC # FLD: 14.2 % — SIGNIFICANT CHANGE UP (ref 10.3–14.5)
SODIUM SERPL-SCNC: 140 MMOL/L — SIGNIFICANT CHANGE UP (ref 135–145)
SODIUM SERPL-SCNC: 140 MMOL/L — SIGNIFICANT CHANGE UP (ref 135–145)
WBC # BLD: 7.46 K/UL — SIGNIFICANT CHANGE UP (ref 3.8–10.5)
WBC # BLD: 7.46 K/UL — SIGNIFICANT CHANGE UP (ref 3.8–10.5)
WBC # FLD AUTO: 7.46 K/UL — SIGNIFICANT CHANGE UP (ref 3.8–10.5)
WBC # FLD AUTO: 7.46 K/UL — SIGNIFICANT CHANGE UP (ref 3.8–10.5)

## 2024-01-08 PROCEDURE — 82803 BLOOD GASES ANY COMBINATION: CPT

## 2024-01-08 PROCEDURE — 82947 ASSAY GLUCOSE BLOOD QUANT: CPT

## 2024-01-08 PROCEDURE — 86803 HEPATITIS C AB TEST: CPT

## 2024-01-08 PROCEDURE — 80048 BASIC METABOLIC PNL TOTAL CA: CPT

## 2024-01-08 PROCEDURE — 71046 X-RAY EXAM CHEST 2 VIEWS: CPT

## 2024-01-08 PROCEDURE — 85027 COMPLETE CBC AUTOMATED: CPT

## 2024-01-08 PROCEDURE — 83735 ASSAY OF MAGNESIUM: CPT

## 2024-01-08 PROCEDURE — 99285 EMERGENCY DEPT VISIT HI MDM: CPT

## 2024-01-08 PROCEDURE — 83605 ASSAY OF LACTIC ACID: CPT

## 2024-01-08 PROCEDURE — 36415 COLL VENOUS BLD VENIPUNCTURE: CPT

## 2024-01-08 PROCEDURE — 85025 COMPLETE CBC W/AUTO DIFF WBC: CPT

## 2024-01-08 PROCEDURE — 85014 HEMATOCRIT: CPT

## 2024-01-08 PROCEDURE — 82435 ASSAY OF BLOOD CHLORIDE: CPT

## 2024-01-08 PROCEDURE — 84295 ASSAY OF SERUM SODIUM: CPT

## 2024-01-08 PROCEDURE — 80053 COMPREHEN METABOLIC PANEL: CPT

## 2024-01-08 PROCEDURE — 87637 SARSCOV2&INF A&B&RSV AMP PRB: CPT

## 2024-01-08 PROCEDURE — 82330 ASSAY OF CALCIUM: CPT

## 2024-01-08 PROCEDURE — 84100 ASSAY OF PHOSPHORUS: CPT

## 2024-01-08 PROCEDURE — 97161 PT EVAL LOW COMPLEX 20 MIN: CPT

## 2024-01-08 PROCEDURE — 84132 ASSAY OF SERUM POTASSIUM: CPT

## 2024-01-08 PROCEDURE — 85018 HEMOGLOBIN: CPT

## 2024-01-08 PROCEDURE — 94640 AIRWAY INHALATION TREATMENT: CPT

## 2024-01-08 PROCEDURE — 99239 HOSP IP/OBS DSCHRG MGMT >30: CPT

## 2024-01-08 RX ORDER — LEVOTHYROXINE SODIUM 125 MCG
1 TABLET ORAL
Qty: 0 | Refills: 0 | DISCHARGE
Start: 2024-01-08

## 2024-01-08 RX ORDER — LEVOTHYROXINE SODIUM 125 MCG
1 TABLET ORAL
Refills: 0 | DISCHARGE

## 2024-01-08 RX ADMIN — Medication 75 MILLIGRAM(S): at 06:03

## 2024-01-08 RX ADMIN — PANTOPRAZOLE SODIUM 40 MILLIGRAM(S): 20 TABLET, DELAYED RELEASE ORAL at 06:03

## 2024-01-08 RX ADMIN — Medication 81 MILLIGRAM(S): at 12:21

## 2024-01-08 RX ADMIN — Medication 25 MILLIGRAM(S): at 06:03

## 2024-01-08 RX ADMIN — Medication 20 MILLIGRAM(S): at 12:21

## 2024-01-08 RX ADMIN — ENOXAPARIN SODIUM 40 MILLIGRAM(S): 100 INJECTION SUBCUTANEOUS at 06:11

## 2024-01-08 RX ADMIN — Medication 100 MILLIGRAM(S): at 12:21

## 2024-01-08 RX ADMIN — Medication 5 MILLIGRAM(S): at 12:55

## 2024-01-08 RX ADMIN — Medication 100 MICROGRAM(S): at 06:03

## 2024-01-08 RX ADMIN — Medication 1 PACKET(S): at 07:48

## 2024-01-08 NOTE — DISCHARGE NOTE PROVIDER - ATTENDING DISCHARGE PHYSICAL EXAMINATION:
Vital Signs Last 24 Hrs  T(C): 36.6 (08 Jan 2024 06:11), Max: 36.6 (07 Jan 2024 18:05)  T(F): 97.9 (08 Jan 2024 06:11), Max: 97.9 (08 Jan 2024 06:11)  HR: 71 (08 Jan 2024 09:37) (66 - 85)  BP: 116/67 (08 Jan 2024 09:37) (116/67 - 155/63)  BP(mean): --  RR: 18 (08 Jan 2024 06:11) (18 - 18)  SpO2: 96% (08 Jan 2024 06:11) (93% - 96%)    Parameters below as of 08 Jan 2024 06:11  Patient On (Oxygen Delivery Method): room air        CONSTITUTIONAL: NAD, well-developed, well-groomed  EYES: Conjunctiva and sclera clear  ENMT: Moist oral mucosa, no pharyngeal injection or exudates   NECK: Supple, midline  RESPIRATORY: Normal respiratory effort; lungs are clear to auscultation bilaterally  CARDIOVASCULAR: Regular rate and rhythm, normal S1 and S2, no murmur/rub/gallop; No lower extremity edema  ABDOMEN: Nontender to palpation, normoactive bowel sounds, no rebound/guarding  MUSCULOSKELETAL:  Normal gait; no clubbing or cyanosis of digits; no joint swelling or tenderness to palpation  PSYCH: A+O to person, place, and time; affect appropriate  NEUROLOGY: CN 2-12 are intact and symmetric; no gross sensory deficits

## 2024-01-08 NOTE — DISCHARGE NOTE NURSING/CASE MANAGEMENT/SOCIAL WORK - PATIENT PORTAL LINK FT
You can access the FollowMyHealth Patient Portal offered by [facility] by registering at the following website: [email or web address] By joining [email or web address]’s FollowMyHealth portal, you will also be able to view your health information using other applications (apps) compatible with our system. You can access the FollowMyHealth Patient Portal offered by [facility] by registering at the following website: [email or web address]. By joining [email or web address]’s FollowMyHealth portal, you will also be able to view your health information using other applications (apps) compatible with our system.

## 2024-01-08 NOTE — DISCHARGE NOTE PROVIDER - CARE PROVIDER_API CALL
ANKITA GARZA  [address]  [place] [address]  Phone: [phone]  Fax: [phone]  Follow Up Time:    ANKITA GARZA  [address]  [place] [address]  Phone: [phone]  Fax: [phone]  Follow Up Time:

## 2024-01-08 NOTE — DISCHARGE NOTE NURSING/CASE MANAGEMENT/SOCIAL WORK - NSDCPEFALRISK_GEN_ALL_CORE
For information on Fall & Injury Prevention, visit: [email or web address] OR  [email or web address] OR  https://www.cdc.gov/steadi/patient.html For information on Fall & Injury Prevention, visit: [email or web address] OR  [email or web address] OR  https://www.cdc.gov/steadi/patient.html

## 2024-01-08 NOTE — DISCHARGE NOTE PROVIDER - HOSPITAL COURSE
HPI:  Pt is a 77yo M w/ PMH of HTN, HLD, OAB, hypothyroidism, depression pw URI symptoms.    Pt w/ limited english. Daughter at bedside providing further interpretation and collateral hx.     Recent travel to [facility] for  w/ return from travel 1 week prior to presentation. 5 days ago pt and his wife started to experience URI symptoms including F/C, nonproductive cough, nasal congestion, fatigue.     Pt visited [facility] and reportedly underwent CTA and CXR which were unremarkable and he was discharged home after which he followed up w/ PMD and started empirically on Azithromycin, Levofloxacin, and Prednisone.     Now presents to [facility] for continued fatigue and worsening lack of appetite. Otherwise reports CP w/ cough but no SOB, palpitations, abd pain, N/V, constipation or diarrhea.     In the ED tachypnea and hypoxia to 92% on RA satting ~96% on 2L NC w/ labs notable for leukocytosis and swab positive for influenza A w/ CXR unremarkable for acute process. He was administered 1L IVF and started on 100cc IVF, Tylenol, and Duoneb.    (2024 03:18)    Hospital Course:      Important Medication Changes and Reason:    Active or Pending Issues Requiring Follow-up:    Advanced Directives:   [ ] Full code  [ ] DNR  [ ] Hospice    Discharge Diagnoses:         HPI:  Pt is a 77yo M w/ PMH of HTN, HLD, OAB, hypothyroidism, depression pw URI symptoms.    Pt w/ limited english. Daughter at bedside providing further interpretation and collateral hx.     Recent travel to [facility] for  w/ return from travel 1 week prior to presentation. 5 days ago pt and his wife started to experience URI symptoms including F/C, nonproductive cough, nasal congestion, fatigue.     Pt visited [facility] and reportedly underwent CTA and CXR which were unremarkable and he was discharged home after which he followed up w/ PMD and started empirically on Azithromycin, Levofloxacin, and Prednisone.     Now presents to [facility] for continued fatigue and worsening lack of appetite. Otherwise reports CP w/ cough but no SOB, palpitations, abd pain, N/V, constipation or diarrhea.     In the ED tachypnea and hypoxia to 92% on RA satting ~96% on 2L NC w/ labs notable for leukocytosis and swab positive for influenza A w/ CXR unremarkable for acute process. He was administered 1L IVF and started on 100cc IVF, Tylenol, and Duoneb.    (2024 03:18)    Hospital Course:      Important Medication Changes and Reason:    Active or Pending Issues Requiring Follow-up:    Advanced Directives:   [ ] Full code  [ ] DNR  [ ] Hospice    Discharge Diagnoses:         HPI:  Pt is a 77yo M w/ PMH of HTN, HLD, OAB, hypothyroidism, depression pw URI symptoms.    Pt w/ limited english. Daughter at bedside providing further interpretation and collateral hx.     Recent travel to [facility] for  w/ return from travel 1 week prior to presentation. 5 days ago pt and his wife started to experience URI symptoms including F/C, nonproductive cough, nasal congestion, fatigue.     Pt visited [facility] and reportedly underwent CTA and CXR which were unremarkable and he was discharged home after which he followed up w/ PMD and started empirically on Azithromycin, Levofloxacin, and Prednisone.     Now presents to [facility] for continued fatigue and worsening lack of appetite. Otherwise reports CP w/ cough but no SOB, palpitations, abd pain, N/V, constipation or diarrhea.     In the ED tachypnea and hypoxia to 92% on RA satting ~96% on 2L NC w/ labs notable for leukocytosis and swab positive for influenza A w/ CXR unremarkable for acute process. He was administered 1L IVF and started on 100cc IVF, Tylenol, and Duoneb.    (2024 03:18)    Hospital Course:  Admitted with hypoxia in the setting of Flu. Now on RA. Received IVF, Chest X ray un remarkable. No PT needs. Clinically improved. DC home today  Dispo/Disch/med rec GAURI Hernandez --------.     Important Medication Changes and Reason:    Active or Pending Issues Requiring Follow-up:    Advanced Directives:   [ x] Full code  [ ] DNR  [ ] Hospice    Discharge Diagnoses:  Acute hypoxic resp failure  Influenza         HPI:  Pt is a 77yo M w/ PMH of HTN, HLD, OAB, hypothyroidism, depression pw URI symptoms.    Pt w/ limited english. Daughter at bedside providing further interpretation and collateral hx.     Recent travel to [facility] for  w/ return from travel 1 week prior to presentation. 5 days ago pt and his wife started to experience URI symptoms including F/C, nonproductive cough, nasal congestion, fatigue.     Pt visited [facility] and reportedly underwent CTA and CXR which were unremarkable and he was discharged home after which he followed up w/ PMD and started empirically on Azithromycin, Levofloxacin, and Prednisone.     Now presents to [facility] for continued fatigue and worsening lack of appetite. Otherwise reports CP w/ cough but no SOB, palpitations, abd pain, N/V, constipation or diarrhea.     In the ED tachypnea and hypoxia to 92% on RA satting ~96% on 2L NC w/ labs notable for leukocytosis and swab positive for influenza A w/ CXR unremarkable for acute process. He was administered 1L IVF and started on 100cc IVF, Tylenol, and Duoneb.    (2024 03:18)    Hospital Course:  Admitted with hypoxia in the setting of Flu. Now on RA. Received IVF, Chest X ray un remarkable. No PT needs. Clinically improved. DC home today  Dispo/Disch/med rec GAURI Hernandez --------.     Important Medication Changes and Reason:    Active or Pending Issues Requiring Follow-up:    Advanced Directives:   [ x] Full code  [ ] DNR  [ ] Hospice    Discharge Diagnoses:  Acute hypoxic resp failure  Influenza         HPI:  Pt is a 77yo M w/ PMH of HTN, HLD, OAB, hypothyroidism, depression pw URI symptoms.    Pt w/ limited english. Daughter at bedside providing further interpretation and collateral hx.     Recent travel to [facility] for  w/ return from travel 1 week prior to presentation. 5 days ago, pt and his wife started to experience URI symptoms including F/C, nonproductive cough, nasal congestion, fatigue.     Pt visited [facility] and reportedly underwent CTA and CXR which were unremarkable and he was discharged home after which he followed up w/ PMD and started empirically on Azithromycin, Levofloxacin, and Prednisone.     Now presents to [facility] for continued fatigue and worsening lack of appetite. Otherwise reports CP w/ cough but no SOB, palpitations, abd pain, N/V, constipation or diarrhea.     In the ED tachypnea and hypoxia to 92% on RA satting ~96% on 2L NC w/ labs notable for leukocytosis and swab positive for influenza A w/ CXR unremarkable for acute process. He was administered 1L IVF and started on 100cc IVF, Tylenol, and Duoneb.    (2024 03:18)    Hospital Course:  Admitted with sepsis (met SIRS with leukocytosis and tachycardia) and acute hypoxic respiratory failure in the setting of Flu. Now on RA. Received IVF, Chest X ray unremarkable. No PT needs. Clinically improved. DC home today  Dispo/Disch/med rec DW Dr Ray.     Important Medication Changes and Reason:  Continue Tamiflu for total of five days (until 24).     Active or Pending Issues Requiring Follow-up:  Follow-up with primary care provider for hospital discharge visit.     Advanced Directives:   [x] Full code  [ ] DNR  [ ] Hospice    Discharge Diagnoses:  Acute hypoxic respiratory failure  Sepsis 2/2 Influenza         HPI:  Pt is a 79yo M w/ PMH of HTN, HLD, OAB, hypothyroidism, depression pw URI symptoms.    Pt w/ limited english. Daughter at bedside providing further interpretation and collateral hx.     Recent travel to [facility] for  w/ return from travel 1 week prior to presentation. 5 days ago, pt and his wife started to experience URI symptoms including F/C, nonproductive cough, nasal congestion, fatigue.     Pt visited [facility] and reportedly underwent CTA and CXR which were unremarkable and he was discharged home after which he followed up w/ PMD and started empirically on Azithromycin, Levofloxacin, and Prednisone.     Now presents to [facility] for continued fatigue and worsening lack of appetite. Otherwise reports CP w/ cough but no SOB, palpitations, abd pain, N/V, constipation or diarrhea.     In the ED tachypnea and hypoxia to 92% on RA satting ~96% on 2L NC w/ labs notable for leukocytosis and swab positive for influenza A w/ CXR unremarkable for acute process. He was administered 1L IVF and started on 100cc IVF, Tylenol, and Duoneb.    (2024 03:18)    Hospital Course:  Admitted with sepsis (met SIRS with leukocytosis and tachycardia) and acute hypoxic respiratory failure in the setting of Flu. Now on RA. Received IVF, Chest X ray unremarkable. No PT needs. Clinically improved. DC home today  Dispo/Disch/med rec DW Dr Ray.     Important Medication Changes and Reason:  Continue Tamiflu for total of five days (until 24).     Active or Pending Issues Requiring Follow-up:  Follow-up with primary care provider for hospital discharge visit.     Advanced Directives:   [x] Full code  [ ] DNR  [ ] Hospice    Discharge Diagnoses:  Acute hypoxic respiratory failure  Sepsis 2/2 Influenza

## 2024-01-08 NOTE — PROGRESS NOTE ADULT - NSPROGADDITIONALINFOA_GEN_ALL_CORE
Time-based billing (NON-critical care).      minutes spent on total encounter. The necessity of the time spent during the encounter on this date of service was due to:     - Reviewing, and interpreting labs, testing, and imaging.  - Independently obtaining a review of systems and performing a physical exam  - Reviewing prior records and where necessary, outpatient records.  - Counselling and educating patient regarding interpretation of aforementioned items and plan of care.      d/w ACP

## 2024-01-08 NOTE — PHYSICAL THERAPY INITIAL EVALUATION ADULT - STANDING BALANCE: STATIC
No new care gaps identified.  [facility] Embedded Care Gaps. Reference number: [id number]. 5/17/2022   9:27:51 PM CDT  
Refill Authorization Note   Kassie Watkins  is requesting a refill authorization.  Brief Assessment and Rationale for Refill:  Approve     Medication Therapy Plan:       Medication Reconciliation Completed: No   Comments:     No Care Gaps recommended.     Note composed:7:49 PM 05/18/2022          
good balance

## 2024-01-08 NOTE — DISCHARGE NOTE NURSING/CASE MANAGEMENT/SOCIAL WORK - NSDCFUADDAPPT_GEN_ALL_CORE_FT
APPTS ARE READY TO BE MADE: [ ] YES    Best Family or Patient Contact (if needed):    Additional Information about above appointments (if needed):    1: Dr. Adams  2:   3:     Other comments or requests:

## 2024-01-08 NOTE — PROGRESS NOTE ADULT - PROBLEM SELECTOR PROBLEM 3
For information on Fall & Injury Prevention, visit: [email or web address] OR  [email or web address] OR  https://www.cdc.gov/steadi/patient.html
HTN (hypertension)

## 2024-01-08 NOTE — DISCHARGE NOTE PROVIDER - NSDCCPCAREPLAN_GEN_ALL_CORE_FT
PRINCIPAL DISCHARGE DIAGNOSIS  Diagnosis: Sepsis with acute hypoxic respiratory failure  Assessment and Plan of Treatment:       SECONDARY DISCHARGE DIAGNOSES  Diagnosis: Type A influenza  Assessment and Plan of Treatment:      PRINCIPAL DISCHARGE DIAGNOSIS  Diagnosis: Sepsis with acute hypoxic respiratory failure  Assessment and Plan of Treatment: You required supplemental oxygen.Chest x ray was normal.  Now on room air. Continue supportive care.      SECONDARY DISCHARGE DIAGNOSES  Diagnosis: Type A influenza  Assessment and Plan of Treatment: Continue supportive care. Maintain isolation for 5 days.     PRINCIPAL DISCHARGE DIAGNOSIS  Diagnosis: Sepsis with acute hypoxic respiratory failure  Assessment and Plan of Treatment: You tested positive for influenza and required supplemental oxygen. Chest x ray was normal. You are no longer requiring supplemental oxygen. Continue Tamiflu 75mg twice daily for four more days (until 1/11/24). Stay well-hydrated      SECONDARY DISCHARGE DIAGNOSES  Diagnosis: Type A influenza  Assessment and Plan of Treatment: Continue supportive care. Maintain isolation for 5 days.

## 2024-01-08 NOTE — PHYSICAL THERAPY INITIAL EVALUATION ADULT - PERTINENT HX OF CURRENT PROBLEM, REHAB EVAL
Pt is a 77yo M w/ PMH of HTN, HLD, OAB, hypothyroidism, depression pw URI symptoms. Recent travel to [facility] for  w/ return from travel 1 week prior to presentation. 5 days ago pt and his wife started to experience URI symptoms including F/C, nonproductive cough, nasal congestion, fatigue.   Pt visited [facility] and reportedly underwent CTA and CXR which were unremarkable and he was discharged home after which he followed up w/ PMD and started empirically on Azithromycin, Levofloxacin, and Prednisone. Now presents to [facility] for continued fatigue and worsening lack of appetite. Otherwise reports CP w/ cough but no SOB, palpitations, abd pain, N/V, constipation or diarrhea. In the ED tachypnea and hypoxia to 92% on RA satting ~96% on 2L NC w/ labs notable for leukocytosis and swab positive for influenza A w/ CXR unremarkable for acute process.   Hosp Course:  XR CHEST: Clear lungs. Pt is a 79yo M w/ PMH of HTN, HLD, OAB, hypothyroidism, depression pw URI symptoms. Recent travel to [facility] for  w/ return from travel 1 week prior to presentation. 5 days ago pt and his wife started to experience URI symptoms including F/C, nonproductive cough, nasal congestion, fatigue.   Pt visited [facility] and reportedly underwent CTA and CXR which were unremarkable and he was discharged home after which he followed up w/ PMD and started empirically on Azithromycin, Levofloxacin, and Prednisone. Now presents to [facility] for continued fatigue and worsening lack of appetite. Otherwise reports CP w/ cough but no SOB, palpitations, abd pain, N/V, constipation or diarrhea. In the ED tachypnea and hypoxia to 92% on RA satting ~96% on 2L NC w/ labs notable for leukocytosis and swab positive for influenza A w/ CXR unremarkable for acute process.   Hosp Course:  XR CHEST: Clear lungs.

## 2024-01-08 NOTE — PROGRESS NOTE ADULT - PROBLEM SELECTOR PROBLEM 5
Surgeon:  Domenico Jara MD



Preoperative Diagnosis:  Laceration of the right volar forearm.



Postoperative Diagnosis:  Laceration of the right volar forearm.  Laceration of partial flexor carpi 
ulnaris, __________ of the middle and ring.



Procedure Performed:  Debridement of skin and subcutaneous muscle, flap closure of splint, repair of 
partial laceration, flexor carpi ulnaris, superficialis, middle and ring.



Anesthesia:  General.



Description Of Procedure:  After satisfactory induction of general anesthesia, right arm was prepped 
with Betadine scrub, Betadine paint, dry sterile drapes applied in the usual manner.  The arm was juan
vated, exsanguinated with an Esmarch, tourniquet inflated to 250 mmHg.  Hand placed on the Roto Lock 
table.  The skin edges were debrided with scalpel and forceps and approximately 6 cm incision was mad
e.  The fascia was opened and dissection proceeded down to the patient's partial laceration of flexor
 carpi radialis approximately 50% and then lacerations of the superficialis of the middle and ring fi
ngers.  There were no deep lacerations extending toward the area of the median nerve.  All the lacera
tions were superficial.  The wound was irrigated with Betadine solution and then the patient underwen
t __________ repair of flexors of the middle and ring with 4-0 prolene suture, then 3-0 Prolene _____
_____ sutures were used to repair the flexor carpi radialis.  After this was done, tourniquet was rel
eased, electrocautery was used for hemostasis.  Partial muscle lacerations were also repaired with 3-
0 Vicryl suture.  The flaps were undermined and advanced and closed with 4-0 Prolene vertical mattres
s using simple sutures.  Dressed with Xeroform, 2 inch Pastora, Kerlix, and the 

splint dorsally with the wrist in a 45 degree flexion.  The patient tolerated procedure well and retu
rned to recovery.





GH/MODL

DD:  07/09/2020 09:56:11Voice ID:  [id number]

DT:  07/09/2020 10:44:03Report ID:  [id number] Hypothyroidism

## 2024-01-08 NOTE — PHYSICAL THERAPY INITIAL EVALUATION ADULT - ADDITIONAL COMMENTS
Pt speaks Mohawk and understands some english. As per daughter pt lives with children and spouse in a two story house with 6-7 CHRIST[address] + handrail, pt's bedroom and bathroom on first floor. Pt independent prior with no device. Pt speaks Slovenian and understands some english. As per daughter pt lives with children and spouse in a two story house with 6-7 [address]RISTINA + handrail, pt's bedroom and bathroom on first floor. Pt independent prior with no device.

## 2024-01-08 NOTE — DISCHARGE NOTE PROVIDER - NSDCMRMEDTOKEN_GEN_ALL_CORE_FT
Aspirin EC 81 mg oral delayed release tablet: 1 tab(s) orally once a day  levothyroxine 100 mcg (0.1 mg) oral tablet: 1 tab(s) orally once a day  metoprolol succinate 25 mg oral tablet, extended release: 1 tab(s) orally once a day  oxyBUTYnin 5 mg/24 hours oral tablet, extended release: 1 tab(s) orally once a day  pantoprazole 40 mg oral delayed release tablet: 1 tab(s) orally once a day  PARoxetine 20 mg oral tablet: 1 tab(s) orally once a day  simvastatin 20 mg oral tablet: 1 tab(s) orally once a day (at bedtime)   Aspirin EC 81 mg oral delayed release tablet: 1 tab(s) orally once a day  levothyroxine 100 mcg (0.1 mg) oral tablet: 1 tab(s) orally once a day  metoprolol succinate 25 mg oral tablet, extended release: 1 tab(s) orally once a day  oseltamivir 75 mg oral capsule: 1 cap(s) orally 2 times a day  oxyBUTYnin 5 mg/24 hours oral tablet, extended release: 1 tab(s) orally once a day  pantoprazole 40 mg oral delayed release tablet: 1 tab(s) orally once a day  PARoxetine 20 mg oral tablet: 1 tab(s) orally once a day  simvastatin 20 mg oral tablet: 1 tab(s) orally once a day (at bedtime)

## 2024-01-08 NOTE — DISCHARGE NOTE PROVIDER - NSDCFUADDAPPT_GEN_ALL_CORE_FT
APPTS ARE READY TO BE MADE: [ ] YES    Best Family or Patient Contact (if needed):    Additional Information about above appointments (if needed):    1:   2:   3:     Other comments or requests:    APPTS ARE READY TO BE MADE: [ ] YES    Best Family or Patient Contact (if needed):    Additional Information about above appointments (if needed):    1: Dr. Adams  2:   3:     Other comments or requests:

## 2024-01-08 NOTE — PROGRESS NOTE ADULT - PROBLEM SELECTOR PLAN 1
- Recent large gathering at  w/ travel on flight   - Not tachycardic and O2 92% on RA w/ normotension and no LE edema- low c/f PE  - Influenza A positive  - CXR w/out consolidation to suspect bacterial PNA  - i/s/o hospitalization, will c/w Tamiflu 75mg BID for 5 day course  - Isolation precautions   - Robitussin and Tessalon Perle for supportive care for cough  - Isolation precautions   - Wean O2 as tolerated

## 2024-01-08 NOTE — DISCHARGE NOTE PROVIDER - PROVIDER TOKENS
PROVIDER:[TOKEN:[[id number]]] PROVIDER:[TOKEN:[[id number]]]

## 2024-01-08 NOTE — PROGRESS NOTE ADULT - ASSESSMENT
77yo M w/ PMH of HTN, HLD, OAB, hypothyroidism, depression pw URI symptoms likely i/s/o Influenza A infection

## 2024-01-08 NOTE — PROGRESS NOTE ADULT - SUBJECTIVE AND OBJECTIVE BOX
[facility] Division of Hospital Medicine  Mary Ray DO  Pager (M-F, 8A-5P): MS Teams PREFERRED      SUBJECTIVE / OVERNIGHT EVENTS:  ADDITIONAL REVIEW OF SYSTEMS:    MEDICATIONS  (STANDING):  aspirin enteric coated 81 milliGRAM(s) Oral daily  enoxaparin Injectable 40 milliGRAM(s) SubCutaneous every 24 hours  levothyroxine 100 MICROGram(s) Oral daily  melatonin 3 milliGRAM(s) Oral at bedtime  metoprolol succinate ER 25 milliGRAM(s) Oral daily  oseltamivir 75 milliGRAM(s) Oral two times a day  oxybutynin XL 5 milliGRAM(s) Oral daily  pantoprazole    Tablet 40 milliGRAM(s) Oral before breakfast  PARoxetine 20 milliGRAM(s) Oral daily  simvastatin 20 milliGRAM(s) Oral at bedtime    MEDICATIONS  (PRN):  acetaminophen     Tablet .. 650 milliGRAM(s) Oral every 6 hours PRN Temp greater or equal to 38C (100.4F), Mild Pain (1 - 3)  albuterol/ipratropium for Nebulization 3 milliLiter(s) Nebulizer every 6 hours PRN Shortness of Breath and/or Wheezing  benzonatate 100 milliGRAM(s) Oral every 8 hours PRN Cough  guaifenesin/dextromethorphan Oral Liquid 10 milliLiter(s) Oral every 4 hours PRN Cough      I&O's Summary    07 Jan 2024 07:01  -  08 Jan 2024 07:00  --------------------------------------------------------  IN: 600 mL / OUT: 0 mL / NET: 600 mL        PHYSICAL EXAM:  Vital Signs Last 24 Hrs  T(C): 36.6 (08 Jan 2024 06:11), Max: 36.6 (07 Jan 2024 18:05)  T(F): 97.9 (08 Jan 2024 06:11), Max: 97.9 (08 Jan 2024 06:11)  HR: 70 (08 Jan 2024 06:11) (66 - 85)  BP: 143/79 (08 Jan 2024 06:11) (143/79 - 172/84)  BP(mean): --  RR: 18 (08 Jan 2024 06:11) (18 - 20)  SpO2: 96% (08 Jan 2024 06:11) (93% - 96%)    Parameters below as of 08 Jan 2024 06:11  Patient On (Oxygen Delivery Method): room air          LABS:                        14.6   7.46  )-----------( 285      ( 08 Jan 2024 07:20 )             43.4     01-08    140  |  104  |  17  ----------------------------<  139<H>  4.0   |  22  |  0.72    Ca    9.2      08 Jan 2024 07:17  Phos  2.7     01-08  Mg     2.0     01-08    TPro  6.9  /  Alb  3.7  /  TBili  0.4  /  DBili  x   /  AST  53<H>  /  ALT  46<H>  /  AlkPhos  56  01-06          Urinalysis Basic - ( 08 Jan 2024 07:17 )    Color: x / Appearance: x / SG: x / pH: x  Gluc: 139 mg/dL / Ketone: x  / Bili: x / Urobili: x   Blood: x / Protein: x / Nitrite: x   Leuk Esterase: x / RBC: x / WBC x   Sq Epi: x / Non Sq Epi: x / Bacteria: x          RADIOLOGY & ADDITIONAL TESTS:  Results Reviewed:   Imaging Personally Reviewed:  Electrocardiogram Personally Reviewed:    COORDINATION OF CARE:  Care Discussed with Consultants/Other Providers [Y/N]: Y  Prior or Outpatient Records Reviewed [Y/N]: Y   [facility] Division of Hospital Medicine  Mary Ray DO  Pager (M-F, 8A-5P): MS Teams PREFERRED      SUBJECTIVE / OVERNIGHT EVENTS:  ADDITIONAL REVIEW OF SYSTEMS:    MEDICATIONS  (STANDING):  aspirin enteric coated 81 milliGRAM(s) Oral daily  enoxaparin Injectable 40 milliGRAM(s) SubCutaneous every 24 hours  levothyroxine 100 MICROGram(s) Oral daily  melatonin 3 milliGRAM(s) Oral at bedtime  metoprolol succinate ER 25 milliGRAM(s) Oral daily  oseltamivir 75 milliGRAM(s) Oral two times a day  oxybutynin XL 5 milliGRAM(s) Oral daily  pantoprazole    Tablet 40 milliGRAM(s) Oral before breakfast  PARoxetine 20 milliGRAM(s) Oral daily  simvastatin 20 milliGRAM(s) Oral at bedtime    MEDICATIONS  (PRN):  acetaminophen     Tablet .. 650 milliGRAM(s) Oral every 6 hours PRN Temp greater or equal to 38C (100.4F), Mild Pain (1 - 3)  albuterol/ipratropium for Nebulization 3 milliLiter(s) Nebulizer every 6 hours PRN Shortness of Breath and/or Wheezing  benzonatate 100 milliGRAM(s) Oral every 8 hours PRN Cough  guaifenesin/dextromethorphan Oral Liquid 10 milliLiter(s) Oral every 4 hours PRN Cough      I&O's Summary    07 Jan 2024 07:01  -  08 Jan 2024 07:00  --------------------------------------------------------  IN: 600 mL / OUT: 0 mL / NET: 600 mL        PHYSICAL EXAM:  Vital Signs Last 24 Hrs  T(C): 36.6 (08 Jan 2024 06:11), Max: 36.6 (07 Jan 2024 18:05)  T(F): 97.9 (08 Jan 2024 06:11), Max: 97.9 (08 Jan 2024 06:11)  HR: 70 (08 Jan 2024 06:11) (66 - 85)  BP: 143/79 (08 Jan 2024 06:11) (143/79 - 172/84)  BP(mean): --  RR: 18 (08 Jan 2024 06:11) (18 - 20)  SpO2: 96% (08 Jan 2024 06:11) (93% - 96%)    Parameters below as of 08 Jan 2024 06:11  Patient On (Oxygen Delivery Method): room air          LABS:                        14.6   7.46  )-----------( 285      ( 08 Jan 2024 07:20 )             43.4     01-08    140  |  104  |  17  ----------------------------<  139<H>  4.0   |  22  |  0.72    Ca    9.2      08 Jan 2024 07:17  Phos  2.7     01-08  Mg     2.0     01-08    TPro  6.9  /  Alb  3.7  /  TBili  0.4  /  DBili  x   /  AST  53<H>  /  ALT  46<H>  /  AlkPhos  56  01-06          Urinalysis Basic - ( 08 Jan 2024 07:17 )    Color: x / Appearance: x / SG: x / pH: x  Gluc: 139 mg/dL / Ketone: x  / Bili: x / Urobili: x   Blood: x / Protein: x / Nitrite: x   Leuk Esterase: x / RBC: x / WBC x   Sq Epi: x / Non Sq Epi: x / Bacteria: x          RADIOLOGY & ADDITIONAL TESTS:  Results Reviewed:   Imaging Personally Reviewed:  Electrocardiogram Personally Reviewed:    COORDINATION OF CARE:  Care Discussed with Consultants/Other Providers [Y/N]: Y  Prior or Outpatient Records Reviewed [Y/N]: Y

## 2025-05-11 NOTE — ED ADULT NURSE NOTE - NS ED NURSE LEVEL OF CONSCIOUSNESS AFFECT
You can access the FollowMyHealth Patient Portal offered by [facility] by registering at the following website: [email or web address]. By joining [facility]’s FollowMyHealth portal, you will also be able to view your health information using other applications (apps) compatible with our system.
Calm/Appropriate

## 2025-08-28 ENCOUNTER — NON-APPOINTMENT (OUTPATIENT)
Age: 80
End: 2025-08-28

## 2025-08-28 PROBLEM — Z00.00 ENCOUNTER FOR PREVENTIVE HEALTH EXAMINATION: Status: ACTIVE | Noted: 2025-08-28

## 2025-08-29 ENCOUNTER — APPOINTMENT (OUTPATIENT)
Dept: ORTHOPEDIC SURGERY | Facility: CLINIC | Age: 80
End: 2025-08-29
Payer: MEDICARE

## 2025-08-29 VITALS — HEIGHT: 69 IN | WEIGHT: 231.48 LBS | BODY MASS INDEX: 34.29 KG/M2

## 2025-08-29 PROCEDURE — 99203 OFFICE O/P NEW LOW 30 MIN: CPT

## 2025-09-02 PROBLEM — F09 COGNITIVE DYSFUNCTION: Status: ACTIVE | Noted: 2025-09-02

## 2025-09-02 PROBLEM — M54.50 ACUTE BILATERAL LOW BACK PAIN WITHOUT SCIATICA: Status: ACTIVE | Noted: 2025-08-29

## 2025-09-02 PROBLEM — I31.39 PERICARDIAL EFFUSION: Status: ACTIVE | Noted: 2025-09-02

## 2025-09-02 PROBLEM — R53.82 CHRONIC FATIGUE: Status: ACTIVE | Noted: 2025-09-02

## 2025-09-02 PROBLEM — S32.010A CLOSED WEDGE COMPRESSION FRACTURE OF L1 VERTEBRA, INITIAL ENCOUNTER: Status: ACTIVE | Noted: 2025-08-29
